# Patient Record
Sex: FEMALE | Race: WHITE
[De-identification: names, ages, dates, MRNs, and addresses within clinical notes are randomized per-mention and may not be internally consistent; named-entity substitution may affect disease eponyms.]

---

## 2018-03-29 ENCOUNTER — HOSPITAL ENCOUNTER (INPATIENT)
Dept: HOSPITAL 88 - ER | Age: 43
LOS: 3 days | Discharge: HOME | DRG: 639 | End: 2018-04-01
Attending: INTERNAL MEDICINE | Admitting: INTERNAL MEDICINE
Payer: COMMERCIAL

## 2018-03-29 VITALS — WEIGHT: 119.05 LBS | HEIGHT: 58 IN | BODY MASS INDEX: 24.99 KG/M2

## 2018-03-29 VITALS — DIASTOLIC BLOOD PRESSURE: 77 MMHG | SYSTOLIC BLOOD PRESSURE: 113 MMHG

## 2018-03-29 VITALS — SYSTOLIC BLOOD PRESSURE: 108 MMHG | DIASTOLIC BLOOD PRESSURE: 78 MMHG

## 2018-03-29 VITALS — DIASTOLIC BLOOD PRESSURE: 58 MMHG | SYSTOLIC BLOOD PRESSURE: 99 MMHG

## 2018-03-29 VITALS — DIASTOLIC BLOOD PRESSURE: 75 MMHG | SYSTOLIC BLOOD PRESSURE: 121 MMHG

## 2018-03-29 VITALS — SYSTOLIC BLOOD PRESSURE: 119 MMHG | DIASTOLIC BLOOD PRESSURE: 81 MMHG

## 2018-03-29 VITALS — SYSTOLIC BLOOD PRESSURE: 121 MMHG | DIASTOLIC BLOOD PRESSURE: 70 MMHG

## 2018-03-29 VITALS — DIASTOLIC BLOOD PRESSURE: 81 MMHG | SYSTOLIC BLOOD PRESSURE: 119 MMHG

## 2018-03-29 VITALS — DIASTOLIC BLOOD PRESSURE: 55 MMHG | SYSTOLIC BLOOD PRESSURE: 94 MMHG

## 2018-03-29 VITALS — SYSTOLIC BLOOD PRESSURE: 118 MMHG | DIASTOLIC BLOOD PRESSURE: 78 MMHG

## 2018-03-29 DIAGNOSIS — Z91.19: ICD-10-CM

## 2018-03-29 DIAGNOSIS — E11.40: ICD-10-CM

## 2018-03-29 DIAGNOSIS — F17.210: ICD-10-CM

## 2018-03-29 DIAGNOSIS — Z79.4: ICD-10-CM

## 2018-03-29 DIAGNOSIS — E13.10: Primary | ICD-10-CM

## 2018-03-29 DIAGNOSIS — I10: ICD-10-CM

## 2018-03-29 DIAGNOSIS — G40.909: ICD-10-CM

## 2018-03-29 DIAGNOSIS — G40.409: ICD-10-CM

## 2018-03-29 LAB
ALBUMIN SERPL-MCNC: 4.2 G/DL (ref 3.5–5)
ALBUMIN/GLOB SERPL: 1.1 {RATIO} (ref 0.8–2)
ALP SERPL-CCNC: 101 IU/L (ref 40–150)
ALT SERPL-CCNC: 7 IU/L (ref 0–55)
ANION GAP SERPL CALC-SCNC: 11 MMOL/L (ref 8–16)
ANION GAP SERPL CALC-SCNC: 20.8 MMOL/L (ref 8–16)
ANION GAP SERPL CALC-SCNC: 27.2 MMOL/L (ref 8–16)
BACTERIA URNS QL MICRO: (no result) /HPF
BASE EXCESS BLDA CALC-SCNC: -23 MMOL/L (ref -2–3)
BASOPHILS # BLD AUTO: 0.1 10*3/UL (ref 0–0.1)
BASOPHILS NFR BLD AUTO: 0.9 % (ref 0–1)
BILIRUB UR QL: NEGATIVE
BUN SERPL-MCNC: 10 MG/DL (ref 7–26)
BUN SERPL-MCNC: 12 MG/DL (ref 7–26)
BUN SERPL-MCNC: 13 MG/DL (ref 7–26)
BUN/CREAT SERPL: 10 (ref 6–25)
BUN/CREAT SERPL: 11 (ref 6–25)
BUN/CREAT SERPL: 12 (ref 6–25)
CALCIUM SERPL-MCNC: 8.4 MG/DL (ref 8.4–10.2)
CALCIUM SERPL-MCNC: 8.7 MG/DL (ref 8.4–10.2)
CALCIUM SERPL-MCNC: 9.3 MG/DL (ref 8.4–10.2)
CHLORIDE SERPL-SCNC: 102 MMOL/L (ref 98–107)
CHLORIDE SERPL-SCNC: 110 MMOL/L (ref 98–107)
CHLORIDE SERPL-SCNC: 113 MMOL/L (ref 98–107)
CK MB SERPL-MCNC: 3 NG/ML (ref 0–5)
CK SERPL-CCNC: 89 IU/L (ref 29–168)
CLARITY UR: CLEAR
CO2 SERPL-SCNC: 17 MMOL/L (ref 22–29)
CO2 SERPL-SCNC: 8 MMOL/L (ref 22–29)
CO2 SERPL-SCNC: 9 MMOL/L (ref 22–29)
COLOR UR: YELLOW
DEPRECATED APTT PLAS QN: 24.8 SECONDS (ref 23.8–35.5)
DEPRECATED INR PLAS: 1.03
DEPRECATED NEUTROPHILS # BLD AUTO: 8.5 10*3/UL (ref 2.1–6.9)
DEPRECATED RBC URNS MANUAL-ACNC: (no result) /HPF (ref 0–5)
EGFRCR SERPLBLD CKD-EPI 2021: 44 ML/MIN (ref 60–?)
EGFRCR SERPLBLD CKD-EPI 2021: 53 ML/MIN (ref 60–?)
EGFRCR SERPLBLD CKD-EPI 2021: > 60 ML/MIN (ref 60–?)
EOSINOPHIL # BLD AUTO: 0 10*3/UL (ref 0–0.4)
EOSINOPHIL NFR BLD AUTO: 0.1 % (ref 0–6)
EPI CELLS URNS QL MICRO: (no result) /LPF
ERYTHROCYTE [DISTWIDTH] IN CORD BLOOD: 17.1 % (ref 11.7–14.4)
GLOBULIN PLAS-MCNC: 4 G/DL (ref 2.3–3.5)
GLUCOSE SERPLBLD-MCNC: 282 MG/DL (ref 74–118)
GLUCOSE SERPLBLD-MCNC: 525 MG/DL (ref 74–118)
GLUCOSE SERPLBLD-MCNC: 57 MG/DL (ref 74–118)
HCO3 BLDA-SCNC: 6 MMOL/L (ref 23–28)
HCT VFR BLD AUTO: 40.5 % (ref 34.2–44.1)
HGB BLD-MCNC: 13.3 G/DL (ref 12–16)
KETONES UR QL STRIP.AUTO: (no result)
LEUKOCYTE ESTERASE UR QL STRIP.AUTO: NEGATIVE
LYMPHOCYTES # BLD: 1.3 10*3/UL (ref 1–3.2)
LYMPHOCYTES NFR BLD AUTO: 12.4 % (ref 18–39.1)
MAGNESIUM SERPL-MCNC: 1.6 MG/DL (ref 1.3–2.1)
MAGNESIUM SERPL-MCNC: 1.8 MG/DL (ref 1.3–2.1)
MCH RBC QN AUTO: 30.6 PG (ref 28–32)
MCHC RBC AUTO-ENTMCNC: 32.8 G/DL (ref 31–35)
MCV RBC AUTO: 93.3 FL (ref 81–99)
MONOCYTES # BLD AUTO: 0.5 10*3/UL (ref 0.2–0.8)
MONOCYTES NFR BLD AUTO: 4.3 % (ref 4.4–11.3)
NEUTS SEG NFR BLD AUTO: 81.9 % (ref 38.7–80)
NITRITE UR QL STRIP.AUTO: NEGATIVE
PCO2 BLDA: 114 MMHG (ref 80–105)
PCO2 BLDA: 20 MMHG (ref 41–51)
PH BLDA: 7.11 [PH] (ref 7.31–7.41)
PLATELET # BLD AUTO: 345 X10E3/UL (ref 140–360)
POTASSIUM SERPL-SCNC: 4 MMOL/L (ref 3.5–5.1)
POTASSIUM SERPL-SCNC: 4.8 MMOL/L (ref 3.5–5.1)
POTASSIUM SERPL-SCNC: 5.2 MMOL/L (ref 3.5–5.1)
PROT UR QL STRIP.AUTO: NEGATIVE
PROTHROMBIN TIME: 12.7 SECONDS (ref 11.9–14.5)
RBC # BLD AUTO: 4.34 X10E6/UL (ref 3.6–5.1)
SAO2 % BLDA: 97 % (ref 95–98)
SODIUM SERPL-SCNC: 132 MMOL/L (ref 136–145)
SODIUM SERPL-SCNC: 135 MMOL/L (ref 136–145)
SODIUM SERPL-SCNC: 137 MMOL/L (ref 136–145)
SP GR UR STRIP: 1.01 (ref 1.01–1.02)
UROBILINOGEN UR STRIP-MCNC: 0.2 MG/DL (ref 0.2–1)
WBC #/AREA URNS HPF: (no result) /HPF (ref 0–5)

## 2018-03-29 PROCEDURE — 80048 BASIC METABOLIC PNL TOTAL CA: CPT

## 2018-03-29 PROCEDURE — 71045 X-RAY EXAM CHEST 1 VIEW: CPT

## 2018-03-29 PROCEDURE — 85025 COMPLETE CBC W/AUTO DIFF WBC: CPT

## 2018-03-29 PROCEDURE — 36600 WITHDRAWAL OF ARTERIAL BLOOD: CPT

## 2018-03-29 PROCEDURE — 83735 ASSAY OF MAGNESIUM: CPT

## 2018-03-29 PROCEDURE — 83880 ASSAY OF NATRIURETIC PEPTIDE: CPT

## 2018-03-29 PROCEDURE — 99284 EMERGENCY DEPT VISIT MOD MDM: CPT

## 2018-03-29 PROCEDURE — 80053 COMPREHEN METABOLIC PANEL: CPT

## 2018-03-29 PROCEDURE — 87040 BLOOD CULTURE FOR BACTERIA: CPT

## 2018-03-29 PROCEDURE — 85610 PROTHROMBIN TIME: CPT

## 2018-03-29 PROCEDURE — 74176 CT ABD & PELVIS W/O CONTRAST: CPT

## 2018-03-29 PROCEDURE — 84702 CHORIONIC GONADOTROPIN TEST: CPT

## 2018-03-29 PROCEDURE — 82805 BLOOD GASES W/O2 SATURATION: CPT

## 2018-03-29 PROCEDURE — 84484 ASSAY OF TROPONIN QUANT: CPT

## 2018-03-29 PROCEDURE — 82010 KETONE BODYS QUAN: CPT

## 2018-03-29 PROCEDURE — 84443 ASSAY THYROID STIM HORMONE: CPT

## 2018-03-29 PROCEDURE — 96366 THER/PROPH/DIAG IV INF ADDON: CPT

## 2018-03-29 PROCEDURE — 84439 ASSAY OF FREE THYROXINE: CPT

## 2018-03-29 PROCEDURE — 82550 ASSAY OF CK (CPK): CPT

## 2018-03-29 PROCEDURE — 81001 URINALYSIS AUTO W/SCOPE: CPT

## 2018-03-29 PROCEDURE — 96376 TX/PRO/DX INJ SAME DRUG ADON: CPT

## 2018-03-29 PROCEDURE — 93005 ELECTROCARDIOGRAM TRACING: CPT

## 2018-03-29 PROCEDURE — 83605 ASSAY OF LACTIC ACID: CPT

## 2018-03-29 PROCEDURE — 96372 THER/PROPH/DIAG INJ SC/IM: CPT

## 2018-03-29 PROCEDURE — 85730 THROMBOPLASTIN TIME PARTIAL: CPT

## 2018-03-29 PROCEDURE — 82948 REAGENT STRIP/BLOOD GLUCOSE: CPT

## 2018-03-29 PROCEDURE — 82553 CREATINE MB FRACTION: CPT

## 2018-03-29 PROCEDURE — 83036 HEMOGLOBIN GLYCOSYLATED A1C: CPT

## 2018-03-29 PROCEDURE — 36415 COLL VENOUS BLD VENIPUNCTURE: CPT

## 2018-03-29 RX ADMIN — KETOROLAC TROMETHAMINE PRN MG: 30 INJECTION, SOLUTION INTRAMUSCULAR; INTRAVENOUS at 21:47

## 2018-03-29 RX ADMIN — SODIUM CHLORIDE SCH MLS/HR: 9 INJECTION, SOLUTION INTRAVENOUS at 16:34

## 2018-03-29 RX ADMIN — SODIUM CHLORIDE SCH MLS/HR: 9 INJECTION, SOLUTION INTRAVENOUS at 19:47

## 2018-03-29 RX ADMIN — SODIUM CHLORIDE SCH MLS/HR: 9 INJECTION, SOLUTION INTRAVENOUS at 22:55

## 2018-03-29 RX ADMIN — NICOTINE SCH MG: 21 PATCH, EXTENDED RELEASE TRANSDERMAL at 19:52

## 2018-03-29 RX ADMIN — SODIUM CHLORIDE SCH MLS/HR: 9 INJECTION, SOLUTION INTRAVENOUS at 22:56

## 2018-03-29 RX ADMIN — DEXTROSE AND SODIUM CHLORIDE SCH MLS/HR: 5; 450 INJECTION, SOLUTION INTRAVENOUS at 20:15

## 2018-03-29 RX ADMIN — SODIUM CHLORIDE SCH MLS/HR: 9 INJECTION, SOLUTION INTRAVENOUS at 15:35

## 2018-03-29 RX ADMIN — SODIUM CHLORIDE SCH MLS/HR: 9 INJECTION, SOLUTION INTRAVENOUS at 16:36

## 2018-03-29 NOTE — XMS REPORT
Patient Summary Document

 Created on: 2018



CAROL CRAIG

External Reference #: [id number]

: 1975

Sex: Female



Demographics







 Address  [address] [place] [address]  [address]

 

 Home Phone  [phone]

 

 Preferred Language  Unknown

 

 Marital Status  Unknown

 

 Scientologist Affiliation  Unknown

 

 Race  Unknown

 

 Ethnic Group  Unknown





Author







 Author  [facility]

 

 Address  Unknown

 

 Phone  Unavailable







Care Team Providers







 Care Team Member Name  Role  Phone

 

 MS PAOASPENHI  Unavailable  Unavailable







Problems

This patient has no known problems.



Allergies, Adverse Reactions, Alerts

This patient has no known allergies or adverse reactions.



Medications

This patient has no known medications.



Results







 Test Description  Test Time  Test Comments  Text Results  Atomic Results  
Result Comments









 EKG  2018 09:54:00     QRS Interval: 86msQT Interval: 331msQTC Interval: 
454msP Axis: 52degQRS Axis: 12degT Wave Axis: 58degP-R Interval: 192msecRR 
Interval: 531msecHeart Rate: 113bpmI 40 Axis: 50degT 40 Axis: -5degST Axis: 
89degEKG Severity: - ABNORMAL ECG -REPORT:Sinus tachycardiaREPORT:Probable left 
atrial enlargementREPORT:Low voltage, extremity and precordial leads   

 

 EKG  2018 09:54:00     QRS Interval: 83msQT Interval: 366msQTC Interval: 
569msP Axis: 51degQRS Axis: -9degT Wave Axis: 43degP-R Interval: 136msecRR 
Interval: 414msecHeart Rate: 145bpmI 40 Axis: 86degT 40 Axis: -18degST Axis: -
53degEKG Severity: - ABNORMAL ECG -REPORT:Sinus tachycardiaREPORT:Left atrial 
enlargementREPORT:Anterior infarct, age indeterminateREPORT:Prolonged QT 
interval   

 

 US VENO EXT. UNILAT  2018 17:58:00     [address] [id number] IMAGING REPORTPatient Name
: Dorota CRAIG of Service: [id number]:  42    Sex: F  Order #: [id number]   
  Room:   [facility]  2SDOB: 1975     X-Ray Number: [id number] Record 
Number: [id number]     Hospital Number: [id number] Physician: MADELEINE CEDENOOrdering Physician: Brenda CEDENO upper extremity venous 
Doppler.History:Extremity pain and swelling.Technique:Right upper extremity 
doppler assessment was performed withgrayscale, color Doppler and spectral 
waveform images.Findings:There is nonocclusive thrombus involving the proximal 
brachial vein.Cephalic vein was not visualized. There is a PICC line catheter 
in thebasilic vein.Impression:Nonocclusive venous thrombosis involving the 
proximal brachial vein.Electronically Signed By: Minesh Albert M.D., 2018 5:56 PMLegally authenticated by HOWARD TALAMANTES 2018 17:56:28   

 

 CHEST 1 VIEW PORTABLE  2018 08:02:00     [address] [id number] IMAGING REPORTPatient Name
: [facility]L [facility]IGThe [facility] of Service: [id number]:  42    Sex: F  Order #: [id number]   
  Room:   [facility]  2SDOB: 1975     X-Ray Number: [id number] Record 
Number: [id number]     Hospital Number: [id number] Physician: Nan CEDENO Physician: Grant MEADOWS:5:06 AMHistory: Respiratory 
failure.Technique: Single AP chest projection.Comparison:2018.Findings:Bilateral interstitial parenchymal infiltrates, worse on the 
left.Tracheostomy device and NG tube appear stable. Right-sided 
hemodialysiscatheter is stable.Impression:Slight worsening in the appearance of 
the chest when compared to prior.Electronically Signed By: Minesh Albert M.D., 2018 8:00 AMLegally authenticated by HOWARD TALAMANTES 2018 08:
00:41   

 

 CHEST 1 VIEW PORTABLE  2018 07:17:00     [address] [id number] IMAGING REPORTPatient Name
: Dorota CRAIG of Service: [id number]:  42    Sex: F  Order #: [id number]   
  Room:   [facility]  2SDOB: 1975     X-Ray Number: [id number] Record 
Number: [id number]     Hospital Number: [id number] Physician: MS PAOONTHIOrdering Physician: Grant MEADOWS one view 2018 at 3:48 
AMHistory: DKA, renal failureComparison: 2018Support devices are 
unchanged.Cardiac, hilar, and mediastinal structures are stable. Lungs still 
showmildly increased interstitial markings with patchy densities at the 
bases.Findings may be due to edema or pneumonia. No acute bony or soft 
tissueabnormalities are identified.Impression:No improvement.Electronically 
Signed By: Marvin Jerez M.D., 2018 7:14 AMLegally authenticated by DULCE BRITO 2018 07:14:54   

 

 UNILAT VENOUS FOR DVT  2018 13:21:00     [place] [id number] IMAGING REPORTPatient Name
: [facility]ERYL [facility]RAIGThe [facility] of Service: [id number]:  42    Sex: F  Order #: [id number]   
  Room:   [facility]  2SDOB: 1975     X-Ray Number: [id number] Record 
Number: [id number]     Hospital Number: [id number] Physician: MS PAOONTHIOrdering Physician: SERENE FERRARI upper extremity venous 
Doppler.History:Upper extremity pain and swelling.Technique:Right upper 
extremity doppler assessment was performed withgrayscale, color Doppler and 
spectral waveform images.Findings:There is no evidence of DVT. There is normal 
flow compression and vascularaugmentation depicted. This includes the basilic 
vein which appears tocontain an IV catheter. There are no specific soft tissue 
defects depicted.Impression:Unremarkableright upper extremity venous Doppler. 
No evidence of DVT. Nosoft tissue defects.Electronically Signed By: Minesh Albert M.D., 2018 1:18 PMLegally authenticated by HOWARD TALAMANTES  13:18:52   

 

 CHEST 1 VIEW PORTABLE  2018 07:00:00     [place] [id number] IMAGING REPORTPatient Name
: CHERYL CRAIGThe [facility] of Service: [id number]:  42    Sex: F  Order #: [id number]   
  Room:   208/ A  2SDOB: 1975     X-Ray Number: [id number] Record 
Number: [id number]     Hospital Number: [id number] Physician: MADELEINE CEDENOOrdering Physician: SERENE FERRARI CChest one view 2018 at 3:49 
AMHistory: DKAComparison: 2018A tracheostomy tube is now in place with tip 
at the clavicular head level.Other support devices are unchanged. No 
pneumothorax.Cardiac, hilar, and mediastinal structures are stable. Patchy 
densitiesthroughout the lungs may be due to edema, atelectasis or pneumonia. 
Minimalpleural effusions are suspected.No improvement.Electronically Signed By: 
Marvin Jerez M.D., 2018 6:57 AMLegally authenticated by DULCE BRITO  06:57:50   

 

 ECHO COMPLETE W/DOPPLER  2018 08:13:00     [facility]PORTName[facility] CAROL CRAIG         
        Study Date: 2018 10:50 AMMRN: [id number]                      
Patient Location: 2S\S\208\S\ADOB: 1975 (M/d/yyyy)          Gender: 
FemaleAge: 42 yrsHeight: 64 in                       Weight: 134 lbBSA: 1.6 
g6Novajc For Study: NEW ONSET DKAProceduresA complete two-dimensional 
transthoracic echocardiogram was performed (2D,M-mode, Doppler and color flow 
Doppler).MMode/2D Measurements and CalculationsIVSd: 1.0 cm                    
   LVIDd: 4.2 cmIVSs: 1.6 cm                       LVIDs: 2.1 cmLVPWd: 0.98 
cmLVPWs: 1.4 cm_____________________________________________________________FS: 
50.0 %                         % IVS thick: 62.8 %EDV(Teich): 79.5 mlESV(Teich)
: 14.6 mlEF(Teich): 81.7 %______________________________________________________
_______SV(ich): 64.9 ml                 MV E-F slope: 10.6 cm/sec_____________
________________________________________________Ao root diam: 2.7 cm           
    LVOT diam: 1.8 cmAo root area: 5.8 cm2              LVOT area: 2.4 cm2ACS: 
1.6 cmLA dimension: 3.8 cm______________________________________________________
_______EDV(MOD-sp4): 34.7 ml              SV(MOD-sp4): 28.4 mlESV(MOD-sp4): 6.3 
mlEF(MOD-sp4): 81.7 %Doppler Measurements and CalculationsMV E max hung: 67.6 cm/
sec             MV dec slope: 67.0 cm/sec2MV A max hung: 107.5 cm/sec            
MV dec time: 1.0 secMV E/A: 0.63________________________________________________
_____________Ao V2 max: 247.2 cm/sec               LV V1 max P.9 mmHgAo 
max P.4 mmHg                  LV V1 max: 222.8 cm/secAVA(V,D): 2.2 cm2_____
________________________________________________________TR max hung: 277.6 cm/
sec              RAP systole: 10.0 mmHgTR max P.8 mmHgRVSP(TR): 40.8 
mmHgLeft VentricleThe left ventricle is normal in size. There is no thrombus. 
There is normalleft ventricular wall thickness. The left ventricular ejection 
fraction isnormal. Left ventricular systolic function is normal. Ejection 
Fraction=60-65%. The left ventricular wall motion is normal.Right VentricleThe 
right ventricle is normal size. There is normal right ventricular 
wallthickness. The right ventricular systolic function is normal.AtriaThe left 
atrial size is normal. Right atrial size is normal. The interatrialseptum is 
intact with no evidence for an atrial septal defect.Mitral ValveThe mitral 
valve leaflets appear normal. There is no evidence of stenosis,fluttering, or 
prolapse. There is no evidence of mitral valve prolapse.There is no mitral 
valve stenosis. There is no mitral regurgitation noted.Tricuspid ValveThe 
tricuspid valve is normal in structure and function. There is notricuspid 
stenosis. No tricuspid regurgitation.Aortic ValveThe aortic valve is normal in 
structure and function. The aortic valve istrileaflet. No hemodynamically 
significant valvular aortic stenosis. Noaortic regurgitation is 
present.Pulmonic ValveThe pulmonic valve is not well seen, but is grossly 
normal. There is nopulmonic valvular stenosis. There is no pulmonic valvular 
regurgitation.Great VesselsThe aortic root is normal size.Pericardium/
PleuralThere is no pericardial effusion. There is no pleural 
effusion.Interpretation SummaryLeft ventricular systolic function is 
normal.Ejection Fraction=60-65%.________________________________________________
_____________________________Reading Physician:                        Ronny Sifuentes MD 2018 08:13Electronically signed by:                 
AMOrdering Physician: MARLYN CEDENOHIReferring Physician: MARLYN CEDENOHIPerformed By: Tatum Jimenez, RCS, RVS   

 

 CHEST 1 VIEW PORTABLE  2018 11:50:00     [place] [id number] IMAGING REPORTPatient Name
: Dorota CRAIG of Service: [id number]:  42    Sex: F  Order #: [id number]   
  Room:   [facility]  2SDOB: 1975     X-Ray Number: [id number] Record 
Number: [id number]     Hospital Number: [id number] Physician: MADELEINE CEDENOOrderjazmine Physician: REECE MEADOWSORY: Diabetic ketoacidosis. 
Hypertension. Shortness of breath.COMPARISON:  2018TECHNIQUE:  Portable 
chest 1 viewFINDINGS:The life-support devices are unchanged. There is improved 
pulmonary edema.There is bibasilar atelectasis with more consolidative 
opacification of theleft lung base. There are small bilateral pleural 
effusions. There is nopneumothorax.IMPRESSION:1. Improved pulmonary edema.2. 
Bibasilar atelectasis and/or pneumonia, left greater than right.3. Small 
bilateral effusions.Electronically Signed By: Sid Gomes M.D., 2018 
11:48 AMLegally authenticated by BRENDA SMILEY 2018 11:48:10   

 

 CHEST 1 VIEW PORTABLE  2018 11:03:00     [place] [id number] IMAGING REPORTPatient Name
: Dorota CRAIG of Service: [id number]:  42    Sex: F  Order #: [id number]   
  Room:   [facility]  2SDOB: 1975     X-Ray Number: [id number] Record 
Number: [id number]     Hospital Number: [id number] Physician: Nan CEDENO Physician: Stevie PAGE:10:45 AMHistory: Respiratory 
failure.Technique: Single AP chest projection.Comparison:2018.Findings:There are patchy bilateral parenchymal infiltrates consistent 
withpulmonary edema or pneumonia. Tracheostomy device has replaced 
theendotracheal tube in the midline. Right-sided hemodialysis catheter 
isstable. The NG tube in the stomach remains.Impression:Worsening bilateral 
parenchymal infiltrates. Interval replacement of theendotracheal tube with a 
tracheostomy.Electronically Signed By: Minesh Albert M.D., 2018 11:01 
AMLegally authenticated by HOWARD TALAMANTES 2018 11:01:05   

 

 CHEST 1 VIEW PORTABLE  2018 13:39:00     [place] [id number] IMAGING REPORTPatient Name
: Dorota CRAIG of Service: [id number]:  42    Sex: F  Order #: [id number]   
  Room:   [facility]  2SDOB: 1975     X-Ray Number: [id number] Record 
Number: [id number]     Hospital Number: [id number] Physician: MADELEINE CEDENOOrdering Physician: Guido PARMAR one view: 1:15 PMHistory: 
Respiratory distressComparisons: YesterdayFindings:Coarsened interstitial 
markings, prominent central pulmonary vasculatureand cephalization of blood 
flow suggests vascular congestion in the properclinical setting. These changes 
are worsened compared to prior.Support lines and tubes are in stable 
position.Perihilar and basilar alveolar infiltrates likely represent alveolar 
edemaalthough correlate for fever and elevated white count.There is no definite 
pleural effusion or pneumothorax.Heart size is enlarged.Impression:Suspected 
vascular congestion which has worsened compared to prior..Electronically Signed 
By: Walker Bearden M.D., 2018 1:37 PMLegally authenticated by CHALINO MENA 2018 13:37:15   

 

 CHEST 1 VIEW PORTABLE  2018 10:42:00     [place] [id number] IMAGING REPORTPatient Name
: Dorota CRAIG of Service: [id number]:  42    Sex: F  Order #: [id number]   
  Room:   [facility]  2SDOB: 1975     X-Ray Number: [id number] Record 
Number: [id number]     Hospital Number: [id number] Physician: Ashley CEDENOing Physician: Grant MEADOWS one view 2018 at 8:57 
AMHistory: DKA, intubationComparison: 2018Support devices are 
unchanged.Cardiac, hilar, and mediastinal structures are stable. Lungs still 
showmildly increased interstitial markings with some airspace opacities at 
thebases. Findings have improved and may be due to residual edema orpneumonia. 
No acute bony or soft tissue abnormalities are identified.Impression:Improving 
pulmonary densities with mild residual, particularly at thebases.Electronically 
Signed By: Marvin Jerez M.D., 2018 10:39 AMLegally authenticated by DULCE BRITO 2018 10:39:56   

 

 ABDOMEN PORTABLE  2018 15:25:00     [place] [id number] IMAGING REPORTPatient Name
: Dorota CRAIG of Service: [id number]:  43    Sex: F  Order #: [id number]   
  Room:   [facility]  2SDOB: 1975     X-Ray Number: [id number] Record 
Number: [id number]     Hospital Number: [id number] Physician: Nan CEDENO Physician: MAGGY PAGE PORTABLE,  2018 3:16 PM:
History:  Abdominal pain. Diabetic ketoacidosis. Nasogastric tubeplacement..   
  .Comparison:  None.Technique:  1 view abdomenFindings/Impression:The 
nasogastric tube terminates in the stomach. There is a Bahena catheterin the 
bladder. The bowel gas pattern is nonobstructive. There is gas andstool within 
the left colon. There are calcified phleboliths within thepelvis 
bilaterally.Electronically Signed By: Sid Gomes M.D., 2018 3:22 
PMLegally authenticated by BRENDA SMILEY 2018 15:22:47   

 

 SPECIAL PROCEDURES  2018 09:16:00     [address] [id number] IMAGING REPORTPatient Name
: Dorota CRAIG of Service: [id number]:  42    Sex: F  Order #: [id number]     
Room:   [facility]  2SDOB: 1975     X-Ray Number: [id number] Record 
Number: [id number]     Hospital Number: [id number] Physician: Nan CEDENO Physician: Jania RAMIREZ guided temporary 
hemodialysis catheter placement 2018 2:09PMHistory:  Hemodialysis catheter 
placement. Acute renal failureTechnique/Findings:After obtaining written, 
informed consent the patient was placed in thesupine position on the ICU bed. 
The right neck was prepped and draped inthe usual sterile fashion. Preliminary 
sonographic assessment of the rightinternal jugular vein demonstrates a patent 
vessel, suitable for access.The overlying skin was anesthetized with 1% 
lidocaine and a smalldermatotomy made. Under real-time, concurrent sonographic 
guidance a21-gauge micropuncture needle was advanced into the internal jugular 
veinand an image saved to the patient's permanent medical record. WithSeldinger 
technique, the track was sequentially dilated. Next, a temporaryhemodialysis 
catheter was advanced over a guidewire into the SVC. Apostprocedure chest 
radiograph documents final catheter placement. Thecatheter aspirates and 
flushes appropriately and was subsequentlyhep-locked. The catheter was secured 
to the skin with a sterile dressing.The patient tolerated the procedure well 
and without complication.Sedation: Local 1% lidocaineEstimated blood loss: Less 
than 5 cc.Immediate complications: None.Impression:Technically successful 
ultrasound guided placement of a right internaljugular temporary hemodialysis 
catheter.Electronically Signed By: Sid Gomes M.D., 2018 2:10 
PMLegally authenticated by BRENDA SMILEY 2018 14:10:16   

 

 CHEST 1 VIEW PORTABLE  2018 08:12:00     [address] [address]JYNLGJAJMW IMAGING REPORTPatient Name
: [facility]L [facility]IGThe [facility] of [facility]: [id number]:  42    Sex: F  Order #: [id number]     
Room:   [facility]  2SDOB: 1975     X-Ray Number: [id number] Record 
Number: [id number]     Hospital Number: [id number] Physician: MADELEINE CEDENOOrdering Physician: CULLEN BROOKSHISTORY:  ARDS.  . Respiratory 
failure. Ventilator dependency.COMPARISON:  2018TECHNIQUE:  Portable chest 
1 viewFINDINGS:The life-support devices are unchanged. The cardiomediastinal 
silhouette isstable. There are evolving opacities throughout the lungs 
bilaterally,which appear progressive on the left. There is likely a small 
lefteffusion. There is no pneumothorax.IMPRESSION:Worsening left lung opacities 
with a small left effusion.Electronically Signed By: Sid Gomes M.D.,  8:09 AMLegally authenticated by BRENDA SMILEY 2018 08:09:57

## 2018-03-29 NOTE — DIAGNOSTIC IMAGING REPORT
PROCEDURE: CT ABDOMEN AND PELVIS WITHOUT CONTRAST

 

TECHNIQUE: 

The abdomen and pelvis were scanned utilizing a multidetector helical 

scanner from the diaphragm to the lesser trochanter after the oral 

administration of Gastrografin mixture (however, patient ingested less 

than 200 mL).  No IV contrast was administered because of physician 

request secondary to DKA. Coronal and sagittal multiplanar reformations 

were obtained.

 

COMPARISON:   None.

 

INDICATIONS:   MID ABDOMINAL PAIN

 

FINDINGS:

 

ABSENCE OF INTRAVENOUS CONTRAST DECREASES SENSITIVITY FOR DETECTION OF 

FOCAL LESIONS AND VASCULAR PATHOLOGY.

 

LOWER THORAX: Normal.

 

HEPATOBILIARY: No focal hepatic lesions.  No biliary ductal dilatation.

SPLEEN: No splenomegaly.

PANCREAS: No focal masses or ductal dilatation.

 

ADRENALS: No adrenal nodules.

KIDNEYS/URETERS: No hydronephrosis, stones, or solid mass lesions.

PELVIC ORGANS/BLADDER: Unremarkable.

 

PERITONEUM / RETROPERITONEUM: No free air or fluid.

LYMPH NODES: No lymphadenopathy.

VESSELS: Mild atherosclerotic and secretions in the aorta.

 

GI TRACT: No distention or wall thickening. Moderate amount stool in 

the colon. No bowel obstruction.

 

BONES AND SOFT TISSUES: Unremarkable.

 

IMPRESSION:

 

1. Almost no visualized positive oral contrast.

 

2. Unremarkable abdomen and pelvis. 

 

Dictated by:  Sal Coelho M.D. on 3/29/2018 at 18:27     

Electronically approved by:  Sal Coelho M.D. on 3/29/2018 at 18:27

## 2018-03-29 NOTE — DIAGNOSTIC IMAGING REPORT
PROCEDURE:

A single AP view of the chest.

 

COMPARISON: None.

 

INDICATIONS:   CHEST PAIN

     

FINDINGS:

Lines/tubes:  None.

 

Lungs:  The lungs are well inflated and clear. There is no evidence of 

pneumonia or pulmonary edema.

 

Pleura:  There is no pleural effusion or pneumothorax.

 

Heart and mediastinum:  The heart and the mediastinum are unremarkable.

 

Bones:  No acute bony abnormality.

 

IMPRESSION: 

 

No acute cardiopulmonary disease.

 

Dictated by:  Sal Coelho M.D. on 3/29/2018 at 15:25     

Electronically approved by:  Sal Coelho M.D. on 3/29/2018 at 15:25

## 2018-03-29 NOTE — HISTORY AND PHYSICAL
HISTORY OF PRESENT ILLNESS:  A 42-year-old female with past medical history 



positive for poorly controlled diabetes, seizure disorder, nicotine 

dependence, heavy smoker.  Patient came here with abdominal pain.  She was 

found to have very high blood sugar with metabolic acidosis.  She was found 

to be in diabetic ketoacidosis.  Therefore, she is going to be admitted to 

the hospital.  Insulin drip and IV fluids have been started and the 

diabetes ketoacidosis protocol has been implemented.  



REVIEW OF SYSTEMS:  

CARDIOVASCULAR:  No chest pain or palpitation. 

RESPIRATORY:  No shortness of breath.  No cough. 

GASTROINTESTINAL:  She complains of nausea, vomiting, diarrhea and 

abdominal pain. 

GENITOURINARY:  No frequency and no dysuria.



ALLERGIES:  NOT ALLERGIC TO ANYTHING EXCEPT BETADINE.  



PAST MEDICAL HISTORY:  Positive for poorly controlled diabetes, seizure 

disorder, nicotine dependence. 



SOCIAL HISTORY:  She smokes and she used to drink until recently.



PHYSICAL EXAMINATION:  

HEART:   Shows regular rhythm.  No murmurs.  No extra sounds. 

LUNGS:  Clear bilaterally. 

ABDOMEN:  Soft. 

EXTREMITIES:  Show no evidence of cyanosis, edema or trauma.  



LABORATORY DATA:  BMP with sodium 135, potassium 4.8, chloride 110.  CO2 9, 

creatinine 1.12.  BUN 12, glucose 282.  On the CBC white blood count 10.4, 

hemoglobin 13.3, hematocrit 40.5, platelet count 345,000.  PT 12.7.  INR 

1.03.  PTT 24.8.  AST 11, ALT 7.  Total bilirubin 0.2, alkaline phosphatase 

101.





FINAL IMPRESSION:  

1. Diabetic ketoacidosis.  

2. Abdominal pain.  

3. Nausea.  

4. Metabolic acidosis.

5. Seizure disorder.

6. Nicotine dependence.  



PLAN OF TREATMENT:  Continue insulin drip.  Continue monitoring BUN, 

creatinine and electrolytes.  Continue with the insulin protocol and 

diabetic ketoacidosis protocol.  Going to start her on NicoDerm patch 21 mg 

daily.  Zofran 4 mg IV q.4 h. as needed.  Toradol 50 mg q.6 h. as needed 

for severe pain.  Dr. Menendez has been consulted from the endocrinology point 

of view.  We are going to continue the seizure medications also.  She is 

going to be admitted to ICU.  All questions have been answered.  



TIME SPENT:  Around 50 minutes.  









DD:  03/29/2018 19:23

DT:  03/29/2018 20:57

Job#:  [id number]

## 2018-03-30 VITALS — SYSTOLIC BLOOD PRESSURE: 103 MMHG | DIASTOLIC BLOOD PRESSURE: 67 MMHG

## 2018-03-30 VITALS — SYSTOLIC BLOOD PRESSURE: 141 MMHG | DIASTOLIC BLOOD PRESSURE: 95 MMHG

## 2018-03-30 VITALS — DIASTOLIC BLOOD PRESSURE: 93 MMHG | SYSTOLIC BLOOD PRESSURE: 133 MMHG

## 2018-03-30 VITALS — DIASTOLIC BLOOD PRESSURE: 86 MMHG | SYSTOLIC BLOOD PRESSURE: 125 MMHG

## 2018-03-30 VITALS — SYSTOLIC BLOOD PRESSURE: 105 MMHG | DIASTOLIC BLOOD PRESSURE: 63 MMHG

## 2018-03-30 VITALS — DIASTOLIC BLOOD PRESSURE: 67 MMHG | SYSTOLIC BLOOD PRESSURE: 104 MMHG

## 2018-03-30 VITALS — DIASTOLIC BLOOD PRESSURE: 93 MMHG | SYSTOLIC BLOOD PRESSURE: 145 MMHG

## 2018-03-30 VITALS — SYSTOLIC BLOOD PRESSURE: 101 MMHG | DIASTOLIC BLOOD PRESSURE: 70 MMHG

## 2018-03-30 VITALS — SYSTOLIC BLOOD PRESSURE: 139 MMHG | DIASTOLIC BLOOD PRESSURE: 95 MMHG

## 2018-03-30 VITALS — SYSTOLIC BLOOD PRESSURE: 126 MMHG | DIASTOLIC BLOOD PRESSURE: 89 MMHG

## 2018-03-30 VITALS — SYSTOLIC BLOOD PRESSURE: 148 MMHG | DIASTOLIC BLOOD PRESSURE: 105 MMHG

## 2018-03-30 VITALS — DIASTOLIC BLOOD PRESSURE: 90 MMHG | SYSTOLIC BLOOD PRESSURE: 127 MMHG

## 2018-03-30 VITALS — DIASTOLIC BLOOD PRESSURE: 73 MMHG | SYSTOLIC BLOOD PRESSURE: 112 MMHG

## 2018-03-30 VITALS — SYSTOLIC BLOOD PRESSURE: 135 MMHG | DIASTOLIC BLOOD PRESSURE: 92 MMHG

## 2018-03-30 VITALS — DIASTOLIC BLOOD PRESSURE: 94 MMHG | SYSTOLIC BLOOD PRESSURE: 153 MMHG

## 2018-03-30 VITALS — DIASTOLIC BLOOD PRESSURE: 94 MMHG | SYSTOLIC BLOOD PRESSURE: 144 MMHG

## 2018-03-30 VITALS — DIASTOLIC BLOOD PRESSURE: 89 MMHG | SYSTOLIC BLOOD PRESSURE: 118 MMHG

## 2018-03-30 VITALS — SYSTOLIC BLOOD PRESSURE: 93 MMHG | DIASTOLIC BLOOD PRESSURE: 58 MMHG

## 2018-03-30 VITALS — DIASTOLIC BLOOD PRESSURE: 91 MMHG | SYSTOLIC BLOOD PRESSURE: 142 MMHG

## 2018-03-30 VITALS — SYSTOLIC BLOOD PRESSURE: 130 MMHG | DIASTOLIC BLOOD PRESSURE: 89 MMHG

## 2018-03-30 VITALS — DIASTOLIC BLOOD PRESSURE: 65 MMHG | SYSTOLIC BLOOD PRESSURE: 98 MMHG

## 2018-03-30 VITALS — DIASTOLIC BLOOD PRESSURE: 73 MMHG | SYSTOLIC BLOOD PRESSURE: 105 MMHG

## 2018-03-30 VITALS — SYSTOLIC BLOOD PRESSURE: 146 MMHG | DIASTOLIC BLOOD PRESSURE: 103 MMHG

## 2018-03-30 VITALS — SYSTOLIC BLOOD PRESSURE: 96 MMHG | DIASTOLIC BLOOD PRESSURE: 58 MMHG

## 2018-03-30 VITALS — SYSTOLIC BLOOD PRESSURE: 137 MMHG | DIASTOLIC BLOOD PRESSURE: 92 MMHG

## 2018-03-30 VITALS — SYSTOLIC BLOOD PRESSURE: 100 MMHG | DIASTOLIC BLOOD PRESSURE: 60 MMHG

## 2018-03-30 VITALS — SYSTOLIC BLOOD PRESSURE: 98 MMHG | DIASTOLIC BLOOD PRESSURE: 62 MMHG

## 2018-03-30 VITALS — SYSTOLIC BLOOD PRESSURE: 87 MMHG | DIASTOLIC BLOOD PRESSURE: 56 MMHG

## 2018-03-30 VITALS — SYSTOLIC BLOOD PRESSURE: 130 MMHG | DIASTOLIC BLOOD PRESSURE: 90 MMHG

## 2018-03-30 VITALS — DIASTOLIC BLOOD PRESSURE: 68 MMHG | SYSTOLIC BLOOD PRESSURE: 98 MMHG

## 2018-03-30 VITALS — SYSTOLIC BLOOD PRESSURE: 122 MMHG | DIASTOLIC BLOOD PRESSURE: 83 MMHG

## 2018-03-30 VITALS — DIASTOLIC BLOOD PRESSURE: 68 MMHG | SYSTOLIC BLOOD PRESSURE: 91 MMHG

## 2018-03-30 VITALS — DIASTOLIC BLOOD PRESSURE: 83 MMHG | SYSTOLIC BLOOD PRESSURE: 124 MMHG

## 2018-03-30 VITALS — SYSTOLIC BLOOD PRESSURE: 91 MMHG | DIASTOLIC BLOOD PRESSURE: 53 MMHG

## 2018-03-30 VITALS — SYSTOLIC BLOOD PRESSURE: 102 MMHG | DIASTOLIC BLOOD PRESSURE: 60 MMHG

## 2018-03-30 VITALS — DIASTOLIC BLOOD PRESSURE: 51 MMHG | SYSTOLIC BLOOD PRESSURE: 91 MMHG

## 2018-03-30 VITALS — DIASTOLIC BLOOD PRESSURE: 116 MMHG | SYSTOLIC BLOOD PRESSURE: 139 MMHG

## 2018-03-30 VITALS — SYSTOLIC BLOOD PRESSURE: 112 MMHG | DIASTOLIC BLOOD PRESSURE: 72 MMHG

## 2018-03-30 VITALS — SYSTOLIC BLOOD PRESSURE: 132 MMHG | DIASTOLIC BLOOD PRESSURE: 114 MMHG

## 2018-03-30 VITALS — DIASTOLIC BLOOD PRESSURE: 99 MMHG | SYSTOLIC BLOOD PRESSURE: 139 MMHG

## 2018-03-30 VITALS — DIASTOLIC BLOOD PRESSURE: 54 MMHG | SYSTOLIC BLOOD PRESSURE: 91 MMHG

## 2018-03-30 VITALS — SYSTOLIC BLOOD PRESSURE: 93 MMHG | DIASTOLIC BLOOD PRESSURE: 59 MMHG

## 2018-03-30 VITALS — DIASTOLIC BLOOD PRESSURE: 60 MMHG | SYSTOLIC BLOOD PRESSURE: 96 MMHG

## 2018-03-30 VITALS — DIASTOLIC BLOOD PRESSURE: 91 MMHG | SYSTOLIC BLOOD PRESSURE: 124 MMHG

## 2018-03-30 VITALS — SYSTOLIC BLOOD PRESSURE: 101 MMHG | DIASTOLIC BLOOD PRESSURE: 62 MMHG

## 2018-03-30 VITALS — SYSTOLIC BLOOD PRESSURE: 131 MMHG | DIASTOLIC BLOOD PRESSURE: 98 MMHG

## 2018-03-30 VITALS — DIASTOLIC BLOOD PRESSURE: 99 MMHG | SYSTOLIC BLOOD PRESSURE: 129 MMHG

## 2018-03-30 VITALS — SYSTOLIC BLOOD PRESSURE: 133 MMHG | DIASTOLIC BLOOD PRESSURE: 93 MMHG

## 2018-03-30 VITALS — DIASTOLIC BLOOD PRESSURE: 94 MMHG | SYSTOLIC BLOOD PRESSURE: 136 MMHG

## 2018-03-30 VITALS — SYSTOLIC BLOOD PRESSURE: 102 MMHG | DIASTOLIC BLOOD PRESSURE: 70 MMHG

## 2018-03-30 VITALS — DIASTOLIC BLOOD PRESSURE: 62 MMHG | SYSTOLIC BLOOD PRESSURE: 98 MMHG

## 2018-03-30 VITALS — SYSTOLIC BLOOD PRESSURE: 135 MMHG | DIASTOLIC BLOOD PRESSURE: 96 MMHG

## 2018-03-30 LAB
ANION GAP SERPL CALC-SCNC: 13.9 MMOL/L (ref 8–16)
ANION GAP SERPL CALC-SCNC: 7.7 MMOL/L (ref 8–16)
BUN SERPL-MCNC: 8 MG/DL (ref 7–26)
BUN SERPL-MCNC: 9 MG/DL (ref 7–26)
BUN/CREAT SERPL: 10 (ref 6–25)
BUN/CREAT SERPL: 10 (ref 6–25)
CALCIUM SERPL-MCNC: 8.6 MG/DL (ref 8.4–10.2)
CALCIUM SERPL-MCNC: 8.7 MG/DL (ref 8.4–10.2)
CHLORIDE SERPL-SCNC: 110 MMOL/L (ref 98–107)
CHLORIDE SERPL-SCNC: 113 MMOL/L (ref 98–107)
CO2 SERPL-SCNC: 13 MMOL/L (ref 22–29)
CO2 SERPL-SCNC: 18 MMOL/L (ref 22–29)
EGFRCR SERPLBLD CKD-EPI 2021: > 60 ML/MIN (ref 60–?)
EGFRCR SERPLBLD CKD-EPI 2021: > 60 ML/MIN (ref 60–?)
GLUCOSE SERPLBLD-MCNC: 106 MG/DL (ref 74–118)
GLUCOSE SERPLBLD-MCNC: 227 MG/DL (ref 74–118)
MAGNESIUM SERPL-MCNC: 1.6 MG/DL (ref 1.3–2.1)
MAGNESIUM SERPL-MCNC: 1.6 MG/DL (ref 1.3–2.1)
POTASSIUM SERPL-SCNC: 3.7 MMOL/L (ref 3.5–5.1)
POTASSIUM SERPL-SCNC: 3.9 MMOL/L (ref 3.5–5.1)
SODIUM SERPL-SCNC: 133 MMOL/L (ref 136–145)
SODIUM SERPL-SCNC: 135 MMOL/L (ref 136–145)
T4 FREE SERPL-MCNC: 0.75 NG/DL (ref 0.9–1.8)
TSH SERPL DL<=0.005 MIU/L-ACNC: 0.86 UIU/ML (ref 0.35–4.94)

## 2018-03-30 RX ADMIN — DEXTROSE AND SODIUM CHLORIDE SCH MLS/HR: 5; 450 INJECTION, SOLUTION INTRAVENOUS at 11:47

## 2018-03-30 RX ADMIN — DEXTROSE AND SODIUM CHLORIDE SCH MLS/HR: 5; 450 INJECTION, SOLUTION INTRAVENOUS at 22:01

## 2018-03-30 RX ADMIN — SODIUM CHLORIDE SCH MLS/HR: 9 INJECTION, SOLUTION INTRAVENOUS at 11:47

## 2018-03-30 RX ADMIN — KETOROLAC TROMETHAMINE PRN MG: 30 INJECTION, SOLUTION INTRAMUSCULAR; INTRAVENOUS at 13:52

## 2018-03-30 RX ADMIN — SODIUM CHLORIDE SCH MLS/HR: 9 INJECTION, SOLUTION INTRAVENOUS at 04:00

## 2018-03-30 RX ADMIN — INSULIN LISPRO SCH UNIT: 100 INJECTION, SOLUTION INTRAVENOUS; SUBCUTANEOUS at 17:37

## 2018-03-30 RX ADMIN — INSULIN LISPRO SCH UNIT: 100 INJECTION, SOLUTION INTRAVENOUS; SUBCUTANEOUS at 17:36

## 2018-03-30 RX ADMIN — INSULIN LISPRO SCH UNIT: 100 INJECTION, SOLUTION INTRAVENOUS; SUBCUTANEOUS at 20:52

## 2018-03-30 RX ADMIN — KETOROLAC TROMETHAMINE PRN MG: 30 INJECTION, SOLUTION INTRAMUSCULAR; INTRAVENOUS at 19:33

## 2018-03-30 RX ADMIN — NICOTINE SCH MG: 21 PATCH, EXTENDED RELEASE TRANSDERMAL at 10:28

## 2018-03-30 RX ADMIN — SODIUM CHLORIDE SCH MLS/HR: 9 INJECTION, SOLUTION INTRAVENOUS at 19:47

## 2018-03-30 RX ADMIN — SODIUM CHLORIDE SCH MLS/HR: 9 INJECTION, SOLUTION INTRAVENOUS at 15:47

## 2018-03-30 RX ADMIN — DEXTROSE AND SODIUM CHLORIDE SCH MLS/HR: 5; 450 INJECTION, SOLUTION INTRAVENOUS at 02:00

## 2018-03-30 RX ADMIN — SODIUM CHLORIDE SCH MLS/HR: 9 INJECTION, SOLUTION INTRAVENOUS at 23:47

## 2018-03-30 NOTE — CONSULTATION
DATE OF CONSULTATION:  March 30, 2018 



ENDOCRINE CONSULTATION



This is a patient of Dr. Guillermo.  Thank you very much for referring this 

patient.   



This is a 42-year-old white female who is referred to me for evaluation of 

uncontrolled diabetes mellitus and diabetic ketoacidosis.  Most of the 

history is available from the chart and some from the patient.  The patient 

reportedly is a known diabetic for almost 3 years and has been on regular 

insulin only along with metformin.  She was, in fact, admitted at [facility] about 5 months back when she had a tracheostomy done, which has 

sealed off at this time.  Patient has history of seizure disorder.  She is 

a chronic smoker and has history of hypertension.  This time the patient 

came to the hospital with history of nausea and vomiting.  Her blood sugar 

was found to be 525.  Anion gap was 27.2.  After the IV fluids and insulin 

drip, her anion gap has improved. 



PHYSICAL EXAMINATION

GENERAL:  Today, the patient is alert, awake, a little bit apprehensive at 

times.  She has slightly slurred speech.  

VITALS:  Her heart rate is around 68.  Blood pressure 120/80 mmHg.

HEENT:  Essentially unremarkable.  Thyroid is palpable.  Clinically she is 

near euthyroid. 

CHEST:  Bilateral vesicular breathing.  She has mild bronchospasm.  Both 

1st and 2nd heart sounds.  There is no 3rd or 4th heart sound.  Ejection 

systolic murmur, grade 2/6.  

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 

extremities.

ABDOMEN:  Epigastric tenderness. 



CLINICAL IMPRESSION

1. Diabetes mellitus, probably type 2.

2. Diabetic ketoacidosis.

3. Seizure disorder. 

4. Chronic smoker.  

5. Noncompliance. 



The plan at this time is to slowly taper off the insulin drip.  Start her 

on subcutaneous insulin.  Monitor her blood sugars, and diet has been 

advanced.  



Thanks for referring this patient.  I will be following this patient with 

you.



 





DD:  03/30/2018 13:44

DT:  03/30/2018 14:42

Job#:  [id number] CR GUNDERSON

## 2018-03-31 VITALS — SYSTOLIC BLOOD PRESSURE: 116 MMHG | DIASTOLIC BLOOD PRESSURE: 70 MMHG

## 2018-03-31 VITALS — DIASTOLIC BLOOD PRESSURE: 83 MMHG | SYSTOLIC BLOOD PRESSURE: 133 MMHG

## 2018-03-31 VITALS — DIASTOLIC BLOOD PRESSURE: 99 MMHG | SYSTOLIC BLOOD PRESSURE: 151 MMHG

## 2018-03-31 VITALS — DIASTOLIC BLOOD PRESSURE: 87 MMHG | SYSTOLIC BLOOD PRESSURE: 137 MMHG

## 2018-03-31 VITALS — SYSTOLIC BLOOD PRESSURE: 149 MMHG | DIASTOLIC BLOOD PRESSURE: 89 MMHG

## 2018-03-31 VITALS — SYSTOLIC BLOOD PRESSURE: 136 MMHG | DIASTOLIC BLOOD PRESSURE: 85 MMHG

## 2018-03-31 RX ADMIN — INSULIN LISPRO SCH UNIT: 100 INJECTION, SOLUTION INTRAVENOUS; SUBCUTANEOUS at 20:48

## 2018-03-31 RX ADMIN — Medication SCH MG: at 17:48

## 2018-03-31 RX ADMIN — SODIUM CHLORIDE SCH MLS/HR: 9 INJECTION, SOLUTION INTRAVENOUS at 07:47

## 2018-03-31 RX ADMIN — INSULIN LISPRO SCH UNIT: 100 INJECTION, SOLUTION INTRAVENOUS; SUBCUTANEOUS at 17:45

## 2018-03-31 RX ADMIN — DILTIAZEM HYDROCHLORIDE SCH MG: 60 TABLET, FILM COATED ORAL at 17:48

## 2018-03-31 RX ADMIN — DEXTROSE AND SODIUM CHLORIDE SCH MLS/HR: 5; 450 INJECTION, SOLUTION INTRAVENOUS at 18:22

## 2018-03-31 RX ADMIN — QUETIAPINE SCH MG: 100 TABLET, FILM COATED ORAL at 21:49

## 2018-03-31 RX ADMIN — NICOTINE SCH MG: 21 PATCH, EXTENDED RELEASE TRANSDERMAL at 09:55

## 2018-03-31 RX ADMIN — INSULIN LISPRO SCH UNIT: 100 INJECTION, SOLUTION INTRAVENOUS; SUBCUTANEOUS at 13:38

## 2018-03-31 RX ADMIN — SODIUM CHLORIDE SCH MLS/HR: 9 INJECTION, SOLUTION INTRAVENOUS at 03:47

## 2018-03-31 RX ADMIN — SODIUM CHLORIDE SCH MLS/HR: 9 INJECTION, SOLUTION INTRAVENOUS at 11:47

## 2018-03-31 RX ADMIN — DEXTROSE AND SODIUM CHLORIDE SCH MLS/HR: 5; 450 INJECTION, SOLUTION INTRAVENOUS at 09:51

## 2018-03-31 RX ADMIN — METFORMIN HYDROCHLORIDE SCH MG: 500 TABLET, FILM COATED ORAL at 17:49

## 2018-03-31 RX ADMIN — DOCUSATE SODIUM SCH MG: 100 TABLET, FILM COATED ORAL at 17:48

## 2018-03-31 RX ADMIN — INSULIN LISPRO SCH UNIT: 100 INJECTION, SOLUTION INTRAVENOUS; SUBCUTANEOUS at 07:30

## 2018-03-31 RX ADMIN — KETOROLAC TROMETHAMINE PRN MG: 30 INJECTION, SOLUTION INTRAMUSCULAR; INTRAVENOUS at 21:55

## 2018-03-31 RX ADMIN — LEVETIRACETAM SCH MG: 500 TABLET, FILM COATED ORAL at 17:48

## 2018-03-31 RX ADMIN — QUETIAPINE SCH MG: 100 TABLET, FILM COATED ORAL at 13:54

## 2018-03-31 RX ADMIN — INSULIN LISPRO SCH UNIT: 100 INJECTION, SOLUTION INTRAVENOUS; SUBCUTANEOUS at 17:49

## 2018-03-31 NOTE — PROGRESS NOTE
DATE:    



ADDENDUM



Also, patient is complaining of constipation.  We are going to add Colace 

100 mg twice a day.









DD:  03/31/2018 14:19

DT:  03/31/2018 15:04

Job#:  [id number] JENNIFER

## 2018-03-31 NOTE — PROGRESS NOTE
DATE:    



INTERNAL MEDICINE PROGRESS NOTE



SUBJECTIVE:  Patient had a seizure today.  Apparently, she was taking 

Keppra at home, but she did not remember the dose or the medication name.  

So, we will start Keppra today.  We gave an IV loaded dose and patient 

doing better now.  Apparently, she also had an episode of blood in the 

stools, so we are going to do stool guaiac.  We are going to get a 

gastroenterology consult also.



PHYSICAL EXAM:

VITAL SIGNS:  Blood pressure 136/85, temperature 97.2, heart rate 74 per 

minute, respiratory rate is 18 per minute, oxygen saturation 98%.

HEART:  Regular rhythm.  Normal S1, S2 sounds.

LUNGS:  Clear bilaterally.

ABDOMEN:  Soft.

EXTREMITIES:  Show no evidence of cyanosis, edema, or trauma.



LABORATORY DATA:  On the BMP, sodium 135, potassium 3.7, chloride 113, CO2 

of 18, BUN 8, creatinine 0.81, glucose 106.  On the CBC, white blood count 

10.4, hemoglobin 13.3, hematocrit 40.5, platelet count 345,000.  PT 12.7, 

INR 1.03, PTT 24.8.  AST 11, ALT 7, total bilirubin 0.2, alkaline 

phosphatase 101.



FINAL IMPRESSION

1. Episode of diabetic ketoacidosis, which is resolved. 

2. Seizure disorder.

3. Hypertension.

4. Tobacco addiction.





PLAN OF TREATMENT:  Continue monitoring blood sugar a.c. and h.s.  Continue 

Keppra 500 mg twice a day.  Continue Toradol 15 mg q.6 hours IV as needed 

for pain.  We are going to continue monitoring blood sugar a.c. and h.s.  

Humalog 8 units before meals.  Seroquel 50 mg 3 times a day.  Nicotine 21 

mg daily patch.  Levemir 12 units at bedtime.  Magnesium oxide 400 mg twice 

a day.  Aspirin 81 mg daily.  Metformin 500 mg twice a day.  Continue with 

Cardizem 90 mg twice a day, glimepiride 2 mg daily, and diabetic diet.  We 

are going to get a neurology consult.









DD:  03/31/2018 14:18

DT:  03/31/2018 15:00

Job#:  [id number] VAS

## 2018-03-31 NOTE — CONSULTATION
DATE OF CONSULTATION:  2018 



NEUROLOGY PROGRESS NOTE 



HISTORY OF PRESENT ILLNESS:  Ms. Cheung is 42-year-old, right-hand-dominant 

woman with past medical history significant for hypertension, 

hyperlipidemia, insulin-dependent diabetes mellitus type 2, and known 

seizure disorder who experienced a seizure at approximately 10 a.m. on 

2018.  The seizure is described as follows:  The patient's nurse, 

Tamera, was alerted by a technician to come to the patient's room as soon as 

possible.  When the nurse arrived in the patient's room approximately 1-2 

minutes later, the patient was sitting in bed with her eyes open and 

drooling.  The patient appeared confused and disoriented.  This behavior 

lasted for several minutes before the patient returned to her neurological 

baseline.  Ms. Cheung's nurse did not witness generalized tonic or 

generalized tonic-clonic activity.  Once the patient returned to her 

neurological baseline, the admitting attending, Dr. Guillermo, was informed 

of the event.  He ordered Keppra 500 mg intravenously once and requested a 

neurology consultation.  



Ms. Cheung and her  report the patient was diagnosed with epilepsy 

approximately 22 years ago.  Ms. Cheung and her  describe her seizures 

as follows:  Initially, the patient stares forward unresponsive.  This 

evolves into generalized tonic-clonic activity.  There has never been 

witnessed tongue biting associated with the tonic-clonic activity.  There 

is occasional bladder incontinence.  The seizure activity lasts for 

approximately 1-2 minutes.  Afterwards, the patient experiences a postictal 

phase characterized by confusion and tiredness.  This lasts for 

approximately 10 to 15 minutes.  



Ms. Cheung was treated with Dilantin 300 mg by mouth at bedtime for 

approximately 22 years.  This medication was discontinued for unknown 

reasons on 2018 when the patient was hospitalized for diabetic 

ketoacidosis.  It was replaced with Keppra 1000 mg by mouth twice daily.  

Ms. Cheung endorses compliance with this medication, and does not report side 

effects associated with taking the medication.  



The patient does not endorse recent sleep deprivation, infection, or use of 

prescription or recreational stimulants.  Ms. Cheung is not under the care of 

a neurologist.  Her primary care physician prescribes her antiepileptic 

medications.  Prior to this mornings event, her last seizure was in 

2017.  



The patient was admitted to [facility] on 2018 in 

diabetic ketoacidosis.  She was initially admitted to the intensive care 

unit, then transferred to the floor on 2018.  During her 

hospitalization, Ms. Cheung has not received her home medication of Keppra 

1000 mg by mouth twice daily.  



REVIEW OF SYSTEMS:  Weight loss of 30 pounds since 2018, chest 

pain, productive cough, abdominal pain, nausea, constipation, change in 

thirst, mood swings, irritability, confusion, numbness of the feet, 

seizures, low back pain.  Otherwise, the 12-point review of systems was 

negative.  



PAST MEDICAL HISTORY:  Hypertension, hyperlipidemia, insulin-dependent 

diabetes mellitus type 2, multiple prior urinary tract infections, 

epilepsy.  



PAST SURGICAL HISTORY:  Tracheostomy.  



PAST HOSPITALIZATIONS:  Multiple hospitalizations for diabetic 

ketoacidosis.  



FAMILY HISTORY:  The patient's paternal and maternal grandparents are 

.  Their medical histories are unknown.  The patient's father is 

alive and has leukemia.  The patient's mother is  from emphysema.  

The patient has 2 older brothers as well as a twin brother.  All are alive 

and healthy.  The patient does not have children.  



SOCIAL HISTORY:  The patient is .  She completed school through the 

11th grade.  Ms. Cheung is not employed at this time.  The patient does 

report current tobacco use.  She has smoked one pack of cigarettes per day 

for the past 30 years.  Ms. Cheung and her  do report a history of 

heavy alcohol use.  She quit drinking alcohol on 2018.  The 

patient endorses a remote history of marijuana use.  



HOME MEDICATIONS:  Aspirin 81 mg per mouth daily, diltiazem 90 mg by mouth 

twice daily, Glimepiride 2 mg by mouth daily, levetiracetam 1000 mg by 

mouth twice daily, magnesium oxide 400 mg by mouth twice daily, metformin 

500 mg by mouth twice daily, Seroquel 50 mg by mouth 3 times daily. 



ALLERGIES:  THE PATIENT REPORTS A POSSIBLE ALLERGY TO BETADINE.  NO KNOWN 

FOOD ALLERGIES.  NO KNOWN ALLERGY TO LATEX.  NO KNOWN ALLERGY TO CONTRAST 

MATERIALS.  



PHYSICAL EXAMINATION 

VITAL SIGNS:  Height 58 inches, weight 119.5 pounds, BMI 25.0 kg per meter 

squared, blood pressure 136/85 mmHg, pulse 74 beats per minute, respiratory 

rate 18 breaths per minute, oxygen saturation 98% on room air.  

GENERAL:  The patient is awake and alert.  Does not appear distressed.  

HEENT:  Normocephalic and atraumatic.  Pupils are equal, round and reactive 

to light.  Moist mucous membranes.  

NECK:  Supple.  No appreciable thyromegaly.  No appreciable carotid bruits. 

CARDIOVASCULAR:  S1 and S2.  Regular rate and rhythm.  No murmurs, rubs or 

gallops.  

RESPIRATORY:  Clear to auscultation bilaterally.  No wheezes, rhonchi or 

rales.

EXTREMITIES:  The skin is warm and dry.  No clubbing, cyanosis or edema.  

The posterior tibial and dorsalis pedis pulses are 2+ and symmetric.

SKIN:  No rashes or lesions.  

NEUROLOGIC:  Memory/attention:  The patient is awake and alert.  Oriented 

to person, place, time, and situation.  

CRANIAL NERVES:  Cranial nerve I:  Not tested.  Cranial nerves II, III, IV, 

VI:  Pupils are equal and round, react briskly to light (from 4 mm to 2 

mm).  Extraocular movements intact.  No nystagmus.  Cranial nerve V:  

Sensation to light touch and pinprick is intact in the bilateral V1 through 

V3 distributions.  Strength of the temporalis and masseter muscles is 

within normal limits.  Cranial nerve VII:  The face is symmetric, as are 

all facial movements.  Strength is within normal limits.  Cranial nerve 

VIII:  Hearing is intact to finger rub bilaterally.  Cranial nerve IX and 

X:  The soft palate elevates equally and symmetrically.  Cranial nerve XI:  

Normal strength of the bilateral sternocleidomastoid and trapezius muscles. 

 Cranial nerve XII:  The tongue protrudes midline and moves symmetrically 

from side to side.  

STRENGTH:  Bulk is normal, and strength is 5/5 in the bilateral deltoids, 

biceps, triceps, wrist flexors and extensors, finger flexors and extensors, 

intrinsic hand muscles, hip flexors, knee flexors and extensors, ankle 

dorsiflexion and plantar flexion, and intrinsic foot muscles.  Tone is 

normal.  

DTRs:  Deep tendon reflexes are 1+ and symmetric at the triceps, biceps, 

brachioradialis, patellas, and Achilles.  Plantar responses are flexor 

bilaterally.   

SENSATION:  Intact to light touch and pinprick in both arms and both legs.  

CEREBELLAR:  Finger-nose-finger and heel-shin movements are intact without 

dysmetria or other impairment.  Rapid alternating movements are intact.  

GAIT:  Deferred.

SPEECH:  Spontaneous speech is normal without appreciable dysarthria or 

aphasia.  Repetition is intact.  

INVOLUNTARY MOVEMENTS:  None.  

PRONATOR DRIFT:  None.

 

LABORATORY DATA:  Serum glucoses in the past 24 hours have ranged from 107 

to 243.  There is no other recent laboratory data available.  



IMAGING:  Chest x-ray on 2018:  No acute cardiopulmonary disease.  CT 

of abdomen/pelvis 2018:  Almost no visualized positive oral contrast. 

 Unremarkable abdomen and pelvis.  



ASSESSMENT AND PLAN:  Ms. Cheung is a 42-year-old, right-hand-dominant woman 

with a past medical history significant for hypertension, hyperlipidemia, 

insulin-dependent diabetes mellitus type 2 and known seizure disorder 

admitted to [facility] on 2018 in diabetic 

ketoacidosis.  The patient was initially admitted to the intensive care 

unit, then transferred to the floor on 2018.  For the first 2 

days of her hospitalization, the patient did not receive her home 

medication of Keppra 1000 mg by mouth twice daily.  The patient's 

neurological examination is nonfocal.  Her laboratory data and diagnostic 

studies have been reviewed and are documented above.  



Ms. Davenports seizure activity this morning was due to medication withdrawal.  

She was without her antiepileptic medication for more than 48 hours, 

leading to a breakthrough seizure.  



RECOMMENDATIONS:  

1. Resume treatment with the patient's home antiseizure medication of 

Keppra 1000 mg by mouth twice daily. 

2. Seizure precautions were discussed in detail with the patient and her 

.  The patient was advised to sleep 7-8 hours per 24 hour period. 

 She was advised to restrict alcohol consumption to 2 ounces or less per 

24 hour period.  Ms. Cheung was instructed to avoid prescription and 

recreational stimulants.  She was informed infectious illness will lower 

the seizure threshold, possibly resulting in seizure activity.  The 

patient was advised not to work at any height above ground, climb up 

ladders, swim alone, etc.  Essentially, the patient was advised to have 

someone with her during any activity where she could injure herself or 

someone else should she have a seizure.  Lastly, the patient was advised 

of [place] state law restricting the patient from driving until she is 

seizure free for the next 3 months.  Ms. Cheung and her  were told 

it is the patient's responsibility to report this most recent seizure to 

the Department of Public Safety.  



Thank you for this consultation.  There are no other recommendations from 

the neurology service.  Please contact me if any questions or concerns 

arise with this patient.  





TIME SPENT:  70 minutes.  

 





DD:  2018 15:46

DT:  2018 19:39

Job#:  [id number]



[email or web address]

## 2018-04-01 VITALS — DIASTOLIC BLOOD PRESSURE: 72 MMHG | SYSTOLIC BLOOD PRESSURE: 112 MMHG

## 2018-04-01 VITALS — SYSTOLIC BLOOD PRESSURE: 127 MMHG | DIASTOLIC BLOOD PRESSURE: 81 MMHG

## 2018-04-01 VITALS — SYSTOLIC BLOOD PRESSURE: 118 MMHG | DIASTOLIC BLOOD PRESSURE: 73 MMHG

## 2018-04-01 VITALS — SYSTOLIC BLOOD PRESSURE: 104 MMHG | DIASTOLIC BLOOD PRESSURE: 66 MMHG

## 2018-04-01 VITALS — DIASTOLIC BLOOD PRESSURE: 76 MMHG | SYSTOLIC BLOOD PRESSURE: 120 MMHG

## 2018-04-01 VITALS — DIASTOLIC BLOOD PRESSURE: 54 MMHG | SYSTOLIC BLOOD PRESSURE: 94 MMHG

## 2018-04-01 VITALS — SYSTOLIC BLOOD PRESSURE: 94 MMHG | DIASTOLIC BLOOD PRESSURE: 54 MMHG

## 2018-04-01 RX ADMIN — DEXTROSE AND SODIUM CHLORIDE SCH MLS/HR: 5; 450 INJECTION, SOLUTION INTRAVENOUS at 12:51

## 2018-04-01 RX ADMIN — Medication SCH MG: at 16:59

## 2018-04-01 RX ADMIN — DEXTROSE AND SODIUM CHLORIDE SCH MLS/HR: 5; 450 INJECTION, SOLUTION INTRAVENOUS at 05:06

## 2018-04-01 RX ADMIN — LEVETIRACETAM SCH MG: 500 TABLET, FILM COATED ORAL at 09:05

## 2018-04-01 RX ADMIN — LEVETIRACETAM SCH MG: 500 TABLET, FILM COATED ORAL at 16:59

## 2018-04-01 RX ADMIN — QUETIAPINE SCH MG: 100 TABLET, FILM COATED ORAL at 21:30

## 2018-04-01 RX ADMIN — INSULIN LISPRO SCH UNIT: 100 INJECTION, SOLUTION INTRAVENOUS; SUBCUTANEOUS at 17:02

## 2018-04-01 RX ADMIN — QUETIAPINE SCH MG: 100 TABLET, FILM COATED ORAL at 16:59

## 2018-04-01 RX ADMIN — NICOTINE SCH MG: 21 PATCH, EXTENDED RELEASE TRANSDERMAL at 09:05

## 2018-04-01 RX ADMIN — DILTIAZEM HYDROCHLORIDE SCH MG: 60 TABLET, FILM COATED ORAL at 09:05

## 2018-04-01 RX ADMIN — INSULIN LISPRO SCH UNIT: 100 INJECTION, SOLUTION INTRAVENOUS; SUBCUTANEOUS at 07:30

## 2018-04-01 RX ADMIN — INSULIN LISPRO SCH UNIT: 100 INJECTION, SOLUTION INTRAVENOUS; SUBCUTANEOUS at 21:35

## 2018-04-01 RX ADMIN — DOCUSATE SODIUM SCH MG: 100 TABLET, FILM COATED ORAL at 16:59

## 2018-04-01 RX ADMIN — Medication SCH MG: at 09:05

## 2018-04-01 RX ADMIN — DOCUSATE SODIUM SCH MG: 100 TABLET, FILM COATED ORAL at 09:05

## 2018-04-01 RX ADMIN — METFORMIN HYDROCHLORIDE SCH MG: 500 TABLET, FILM COATED ORAL at 16:59

## 2018-04-01 RX ADMIN — DILTIAZEM HYDROCHLORIDE SCH MG: 60 TABLET, FILM COATED ORAL at 16:59

## 2018-04-01 RX ADMIN — METFORMIN HYDROCHLORIDE SCH MG: 500 TABLET, FILM COATED ORAL at 09:05

## 2018-04-01 RX ADMIN — INSULIN LISPRO SCH UNIT: 100 INJECTION, SOLUTION INTRAVENOUS; SUBCUTANEOUS at 12:50

## 2018-04-01 RX ADMIN — QUETIAPINE SCH MG: 100 TABLET, FILM COATED ORAL at 09:05

## 2018-04-01 NOTE — DISCHARGE SUMMARY
HISTORY OF PRESENT ILLNESS:  A 42-year-old female who had a past medical 

history positive mainly for diabetes, history of seizure disorder, nicotine 

dependency, heavy smoker, came here with abdominal pain.  She was found to 

have very high blood sugar with metabolic acidosis.  She was found to be in 

diabetes ketoacidosis.  She was admitted with an insulin drip.  Patient was 

finally removed from insulin drip and started on regimen of Levemir and 

Humalog.  She is going home today.  



PHYSICAL EXAM:  

VITAL SIGNS:  Blood pressure 112/72, temperature 96.9, heart rate 76 per 

minute, respiratory rate is 18 per minute, oxygen saturation 98%.

HEART:  Regular rhythm. No murmur. No extra sounds. 

LUNGS:  Clear bilaterally. 

ABDOMEN:  Soft.

EXTREMITIES: Show no evidence of cyanosis, edema or trauma.



LABORATORY DATA:  On the BMP, sodium 135, potassium 3.7, chloride 113, CO2 

18, BUN 9, creatinine 0.81, glucose 106.  On the CBC white blood count 

10.4, hemoglobin 13.3, hematocrit 40.5, platelet count 345,000.  PT 12.7, 

INR 1.03, PTT 24.8.  AST 11, ALT 7, total bilirubin 0.2, alkaline 

phosphatase 101.  



FINAL IMPRESSION

1. Diabetic ketoacidosis which is resolved. 

2. Seizure disorder.

3. Hypertension.  

4. History of smoking.  



PLAN OF TREATMENT:  We are going to discharge the patient on Keppra.  She 

is taking 1000 mg twice a day.  Continue Levemir 12 units at bedtime.  

Continue metformin 500 mg twice a day.  Continue Humalog 8 units before 

meals.  Continue aspirin 81 mg daily.  Seroquel 50 mg 3 times a day.  

NicoDerm patch 21 mg daily.  Continue monitoring blood sugar a.c. and 

nightly.    Diltiazem 90 mg twice a day.  Colace 100 mg twice a day.  

Humalog ____ 10 units before meals. Follow up with primary care physician 

in a week.  Continue 1800 calorie ADA diet.  







 _________________________________

RODERICK GONZALES MD



DD:  04/01/2018 13:16

DT:  04/01/2018 17:54

Job#:  [id number]